# Patient Record
Sex: FEMALE | Race: BLACK OR AFRICAN AMERICAN | NOT HISPANIC OR LATINO | URBAN - METROPOLITAN AREA
[De-identification: names, ages, dates, MRNs, and addresses within clinical notes are randomized per-mention and may not be internally consistent; named-entity substitution may affect disease eponyms.]

---

## 2019-01-01 ENCOUNTER — EMERGENCY (EMERGENCY)
Facility: HOSPITAL | Age: 24
LOS: 1 days | Discharge: OTHER INSTITUTION | End: 2019-01-01
Attending: EMERGENCY MEDICINE
Payer: COMMERCIAL

## 2019-01-01 VITALS
OXYGEN SATURATION: 98 % | HEART RATE: 74 BPM | RESPIRATION RATE: 17 BRPM | DIASTOLIC BLOOD PRESSURE: 78 MMHG | SYSTOLIC BLOOD PRESSURE: 109 MMHG | TEMPERATURE: 98 F | WEIGHT: 115.96 LBS | HEIGHT: 62 IN

## 2019-01-01 DIAGNOSIS — F33.2 MAJOR DEPRESSIVE DISORDER, RECURRENT SEVERE WITHOUT PSYCHOTIC FEATURES: ICD-10-CM

## 2019-01-01 LAB
ALBUMIN SERPL ELPH-MCNC: 4.4 G/DL — SIGNIFICANT CHANGE UP (ref 3.3–5.2)
ALP SERPL-CCNC: 48 U/L — SIGNIFICANT CHANGE UP (ref 40–120)
ALT FLD-CCNC: 12 U/L — SIGNIFICANT CHANGE UP
AMPHET UR-MCNC: NEGATIVE — SIGNIFICANT CHANGE UP
ANION GAP SERPL CALC-SCNC: 12 MMOL/L — SIGNIFICANT CHANGE UP (ref 5–17)
APAP SERPL-MCNC: <7.5 UG/ML — LOW (ref 10–26)
APPEARANCE UR: CLEAR — SIGNIFICANT CHANGE UP
AST SERPL-CCNC: 13 U/L — SIGNIFICANT CHANGE UP
BARBITURATES UR SCN-MCNC: NEGATIVE — SIGNIFICANT CHANGE UP
BENZODIAZ UR-MCNC: NEGATIVE — SIGNIFICANT CHANGE UP
BILIRUB SERPL-MCNC: 1.6 MG/DL — SIGNIFICANT CHANGE UP (ref 0.4–2)
BILIRUB UR-MCNC: NEGATIVE — SIGNIFICANT CHANGE UP
BUN SERPL-MCNC: 6 MG/DL — LOW (ref 8–20)
CALCIUM SERPL-MCNC: 8.6 MG/DL — SIGNIFICANT CHANGE UP (ref 8.6–10.2)
CHLORIDE SERPL-SCNC: 105 MMOL/L — SIGNIFICANT CHANGE UP (ref 98–107)
CO2 SERPL-SCNC: 23 MMOL/L — SIGNIFICANT CHANGE UP (ref 22–29)
COCAINE METAB.OTHER UR-MCNC: NEGATIVE — SIGNIFICANT CHANGE UP
COLOR SPEC: YELLOW — SIGNIFICANT CHANGE UP
CREAT SERPL-MCNC: 0.42 MG/DL — LOW (ref 0.5–1.3)
D DIMER BLD IA.RAPID-MCNC: 164 NG/ML DDU — SIGNIFICANT CHANGE UP
DIFF PNL FLD: NEGATIVE — SIGNIFICANT CHANGE UP
EPI CELLS # UR: SIGNIFICANT CHANGE UP
ETHANOL SERPL-MCNC: <10 MG/DL — SIGNIFICANT CHANGE UP
GLUCOSE SERPL-MCNC: 77 MG/DL — SIGNIFICANT CHANGE UP (ref 70–115)
GLUCOSE UR QL: NEGATIVE MG/DL — SIGNIFICANT CHANGE UP
HCG SERPL-ACNC: <4 MIU/ML — SIGNIFICANT CHANGE UP
HCG UR QL: NEGATIVE — SIGNIFICANT CHANGE UP
HCT VFR BLD CALC: 29.1 % — LOW (ref 37–47)
HGB BLD-MCNC: 10.4 G/DL — LOW (ref 12–16)
KETONES UR-MCNC: ABNORMAL
LEUKOCYTE ESTERASE UR-ACNC: ABNORMAL
MCHC RBC-ENTMCNC: 29.9 PG — SIGNIFICANT CHANGE UP (ref 27–31)
MCHC RBC-ENTMCNC: 35.7 G/DL — SIGNIFICANT CHANGE UP (ref 32–36)
MCV RBC AUTO: 83.6 FL — SIGNIFICANT CHANGE UP (ref 81–99)
METHADONE UR-MCNC: NEGATIVE — SIGNIFICANT CHANGE UP
NITRITE UR-MCNC: NEGATIVE — SIGNIFICANT CHANGE UP
OPIATES UR-MCNC: NEGATIVE — SIGNIFICANT CHANGE UP
PCP SPEC-MCNC: SIGNIFICANT CHANGE UP
PCP UR-MCNC: NEGATIVE — SIGNIFICANT CHANGE UP
PH UR: 6 — SIGNIFICANT CHANGE UP (ref 5–8)
PLATELET # BLD AUTO: 441 K/UL — HIGH (ref 150–400)
POTASSIUM SERPL-MCNC: 3.6 MMOL/L — SIGNIFICANT CHANGE UP (ref 3.5–5.3)
POTASSIUM SERPL-SCNC: 3.6 MMOL/L — SIGNIFICANT CHANGE UP (ref 3.5–5.3)
PROT SERPL-MCNC: 6.6 G/DL — SIGNIFICANT CHANGE UP (ref 6.6–8.7)
PROT UR-MCNC: 15 MG/DL
RBC # BLD: 3.48 M/UL — LOW (ref 4.4–5.2)
RBC # FLD: 17.3 % — HIGH (ref 11–15.6)
RBC CASTS # UR COMP ASSIST: NEGATIVE /HPF — SIGNIFICANT CHANGE UP (ref 0–4)
SALICYLATES SERPL-MCNC: 3 MG/DL — LOW (ref 10–20)
SODIUM SERPL-SCNC: 140 MMOL/L — SIGNIFICANT CHANGE UP (ref 135–145)
SP GR SPEC: 1.02 — SIGNIFICANT CHANGE UP (ref 1.01–1.02)
THC UR QL: NEGATIVE — SIGNIFICANT CHANGE UP
TROPONIN T SERPL-MCNC: <0.01 NG/ML — SIGNIFICANT CHANGE UP (ref 0–0.06)
TSH SERPL-MCNC: 0.97 UIU/ML — SIGNIFICANT CHANGE UP (ref 0.27–4.2)
UROBILINOGEN FLD QL: 4 MG/DL
WBC # BLD: 11.4 K/UL — HIGH (ref 4.8–10.8)
WBC # FLD AUTO: 11.4 K/UL — HIGH (ref 4.8–10.8)
WBC UR QL: SIGNIFICANT CHANGE UP

## 2019-01-01 PROCEDURE — 85379 FIBRIN DEGRADATION QUANT: CPT

## 2019-01-01 PROCEDURE — 99285 EMERGENCY DEPT VISIT HI MDM: CPT

## 2019-01-01 PROCEDURE — 84484 ASSAY OF TROPONIN QUANT: CPT

## 2019-01-01 PROCEDURE — 36415 COLL VENOUS BLD VENIPUNCTURE: CPT

## 2019-01-01 PROCEDURE — 93005 ELECTROCARDIOGRAM TRACING: CPT

## 2019-01-01 PROCEDURE — 81025 URINE PREGNANCY TEST: CPT

## 2019-01-01 PROCEDURE — 85027 COMPLETE CBC AUTOMATED: CPT

## 2019-01-01 PROCEDURE — 93010 ELECTROCARDIOGRAM REPORT: CPT

## 2019-01-01 PROCEDURE — 81001 URINALYSIS AUTO W/SCOPE: CPT

## 2019-01-01 PROCEDURE — 84702 CHORIONIC GONADOTROPIN TEST: CPT

## 2019-01-01 PROCEDURE — 84443 ASSAY THYROID STIM HORMONE: CPT

## 2019-01-01 PROCEDURE — 71046 X-RAY EXAM CHEST 2 VIEWS: CPT | Mod: 26

## 2019-01-01 PROCEDURE — 80307 DRUG TEST PRSMV CHEM ANLYZR: CPT

## 2019-01-01 PROCEDURE — 71046 X-RAY EXAM CHEST 2 VIEWS: CPT

## 2019-01-01 PROCEDURE — 80053 COMPREHEN METABOLIC PANEL: CPT

## 2019-01-01 RX ORDER — SODIUM CHLORIDE 9 MG/ML
1000 INJECTION INTRAMUSCULAR; INTRAVENOUS; SUBCUTANEOUS ONCE
Qty: 0 | Refills: 0 | Status: COMPLETED | OUTPATIENT
Start: 2019-01-01 | End: 2019-01-01

## 2019-01-01 RX ADMIN — SODIUM CHLORIDE 2000 MILLILITER(S): 9 INJECTION INTRAMUSCULAR; INTRAVENOUS; SUBCUTANEOUS at 13:45

## 2019-01-01 NOTE — ED PROVIDER NOTE - MEDICAL DECISION MAKING DETAILS
patient without any PE risk factors- subjectively thinks left leg swollen but not evident on exam, and not currently on OCP, will get d-dimer. labs with TSH. patient endorsing anxiety/SI. will need 1:1 and psych consult. likely etiology of CP as patient is 23 and healthy. will check labs/cxr reasses

## 2019-01-01 NOTE — ED BEHAVIORAL HEALTH NOTE - BEHAVIORAL HEALTH NOTE
SW Note: pt requires inpatient admission for safety and stability. pt agreeable to voluntary legals - but prefers to wait to see if she can go to [facility] [place] or [place]  as [facility] is too far and [facility] will not accept 9.13.  [facility] states there are no adult beds available at this time.  SW to follow for transfer when appropriate bed is available.

## 2019-01-01 NOTE — ED PROVIDER NOTE - PHYSICAL EXAMINATION
Gen: NAD, AOx3  Head: NCAT  HEENT: PERRL, EOMI, oral mucosa moist, normal conjunctiva, neck supple  Lung: CTAB, no respiratory distress  CV: rrr, no murmur, Normal perfusion  Abd: soft, NTND, no CVA tenderness  MSK: No edema, no visible deformities, no asymmetry or ttp b/l calves  Neuro: No focal neurologic deficits  Skin: No rash   Psych: normal affect, tearful, crying during exam

## 2019-01-01 NOTE — ED PROVIDER NOTE - OBJECTIVE STATEMENT
22yo F with palpitations started last night when going to sleep, fell asleep when woke up still had palpitations. also with CP sharp- midsternal nonradiating. intermittent +pleuritic. no fever/chills +dry cough. no nausea/vomiting. feels left leg is swollen, no recent travel/trauma/sx. no h/o DVT/PE was on OCP not in ~1 month. LMP 12/2. has a lot of stress at work/life. sees therapist, feels anxious/sad and this morning suicidal thoughts. no plan. no attempts hospitalization in past. no drinking/drugs. +mild alcohol

## 2019-01-01 NOTE — ED ADULT NURSE NOTE - OBJECTIVE STATEMENT
pt came to the ED for c/o of feeling that her heart is raising and having difficulty of catching her breath.   pt stated that has a PMH of anxiety and depression in the past.  pt is A/Ox3 and very anxious.

## 2019-01-01 NOTE — ED PROVIDER NOTE - NS ED ROS FT
ROS: +CP/SOB. +cough. no fever. no n/v/d/c. no abd pain. no rash. no bleeding. no urinary complaints. no weakness. no vision changes. no HA. no neck/back pain. no extremity swelling/deformity. No change in mental status.

## 2019-01-01 NOTE — ED BEHAVIORAL HEALTH ASSESSMENT NOTE - SUICIDE RISK FACTORS
Hopelessness/Agitation/severe anxiety/Perceived burden on family and others/Substance abuse/dependence

## 2019-01-01 NOTE — ED ADULT NURSE REASSESSMENT NOTE - DESCRIPTION
received pt ao states she felt like she was having a panic attack.  I couldn't breath my heart felt like it was going to stop. I think of hurting myself.  I would just go out driving in my car and never come back.  I did try this once in oct.  I have family problems.  I feel like my mother blames me for everything I don't have a close relationship with her.  she doesn't say I do a good job.  she doesn't hug me.  I would like to be close to her.  I now live with my boyfriend which is good.  pt states sometimes has a hard time sleeping and will have a few beers to help her sleep.  but then I cant get up for work.  I call in.  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   pt admits to recently starting to see a therapist in dec.  she goes once a week on mon. pt  states after seeing the therapist on mon she makes me feel better. I feel good for a few days.  pt thinks in pt admission might be good for her.  pt states I will do anything I like my life I want to make it better.  pt tearful crying during interview.  support given safety maintained plan of care dicussed with pt .  aware to be admitted and await available in pt unit. pt verbalizes agreement in plan of care. received pt ao in street clothing.states she felt like she was having a panic attack.  I couldn't breath my heart felt like it was going to stop. I think of hurting myself.  I would just go out driving in my car and never come back.  I did try this once in oct.  I have family problems.  I feel like my mother blames me for everything I don't have a close relationship with her.  she doesn't say I do a good job.  she doesn't hug me.  I would like to be close to her.  I now live with my boyfriend which is good.  pt states sometimes has a hard time sleeping and will have a few beers to help her sleep.  but then I cant get up for work.  I call in.  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   pt admits to recently starting to see a therapist in dec.  she goes once a week on mon. pt  states after seeing the therapist on mon she makes me feel better. I feel good for a few days.  pt thinks in pt admission might be good for her.  pt states I will do anything I like my life I want to make it better.  pt tearful crying during interview.  support given safety maintained plan of care dicussed with pt .  aware to be admitted and await available in pt unit. pt verbalizes agreement in plan of care. pt wanded for contraband belonging secured in  closet.

## 2019-01-01 NOTE — ED BEHAVIORAL HEALTH ASSESSMENT NOTE - HPI (INCLUDE ILLNESS QUALITY, SEVERITY, DURATION, TIMING, CONTEXT, MODIFYING FACTORS, ASSOCIATED SIGNS AND SYMPTOMS)
Pt is a 24 yo female who presented to ED for SOB & difficulty breathing.  Reports she gets so panicky and anxious she is not able to breathe.  Pt reports she has thoughts of suicide- getting in the car and never coming back.  Pt reports multiple family stressors.  States her mother always blames her for everything and makes her feel guilty by telling her that she gave her an education. She is tearful.  Reports she spent last night with her family and it went well but then this morning her mother called and told her everything was awful.  Pt recently started seeing an individual psychotherapist and states she feels better after talking to her but then several days later she feels depressed and anxious again.  Reports she lives with her boyfriend to get away from her mother.  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  Pt states she does not feel safe in her current state of mind.  Pt requires inpatient psychiatric hospitalization for safety and medication management.

## 2019-01-01 NOTE — ED BEHAVIORAL HEALTH ASSESSMENT NOTE - SUMMARY
Pt is a 22 yo female who presented to ED for SOB & difficulty breathing.  Reports she gets so panicky and anxious she is not able to breathe.  Pt reports she has thoughts of suicide- getting in the car and never coming back.  Pt reports multiple family stressors.  States her mother always blames her for everything and makes her feel guilty by telling her that she gave her an education. She is tearful.  Reports she spent last night with her family and it went well but then this morning her mother called and told her everything was awful. Pt reports she has been depressed with thoughts of not wanting to be here since she was a child.  Pt recently started seeing an individual psychotherapist and states she feels better after talking to her but then several days later she feels depressed and anxious again.  Reports she lives with her boyfriend to get away from her mother.  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  Pt states she does not feel safe in her current state of mind.  Pt requires inpatient psychiatric hospitalization for safety and medication management.

## 2019-01-02 VITALS
RESPIRATION RATE: 18 BRPM | OXYGEN SATURATION: 100 % | TEMPERATURE: 99 F | SYSTOLIC BLOOD PRESSURE: 100 MMHG | HEART RATE: 85 BPM | DIASTOLIC BLOOD PRESSURE: 66 MMHG

## 2019-01-02 NOTE — ED ADULT NURSE REASSESSMENT NOTE - GENERAL PATIENT STATE
comfortable appearance/cooperative/resting/sleeping
resting/sleeping/comfortable appearance/resp even unlabored
comfortable appearance/cooperative
cooperative/comfortable appearance

## 2019-01-02 NOTE — ED BEHAVIORAL HEALTH NOTE - BEHAVIORAL HEALTH NOTE
MAICOL Note: Pt has been accepted to [facility] for inpt psychiatric tx. Met with pt. She is in agreement with the plan and signed voluntary admission form, 9.13. She has been using the unit phone and notified her boyfriend. Ambulance arranged with [facility]. Insurance auth completed, spoke with Salomon from [employer] [phone]. Auth approved for 2 days 1/2/19 & 1/3/19. Auth# [id number]. Review due 1/4 with ins JUANITA Mane @ [phone]. Info forwarded to [facility] dept.

## 2019-01-02 NOTE — ED ADULT NURSE REASSESSMENT NOTE - COMFORT CARE
warm blanket provided
meal provided/ambulated to bathroom/plan of care explained
plan of care explained

## 2019-02-09 ENCOUNTER — EMERGENCY (EMERGENCY)
Facility: HOSPITAL | Age: 24
LOS: 1 days | Discharge: HOME | End: 2019-02-09
Attending: EMERGENCY MEDICINE
Payer: COMMERCIAL

## 2019-02-09 VITALS
OXYGEN SATURATION: 100 % | HEART RATE: 91 BPM | TEMPERATURE: 98 F | DIASTOLIC BLOOD PRESSURE: 55 MMHG | RESPIRATION RATE: 18 BRPM | SYSTOLIC BLOOD PRESSURE: 98 MMHG

## 2019-02-09 VITALS
RESPIRATION RATE: 18 BRPM | TEMPERATURE: 98 F | HEART RATE: 77 BPM | DIASTOLIC BLOOD PRESSURE: 68 MMHG | SYSTOLIC BLOOD PRESSURE: 99 MMHG | OXYGEN SATURATION: 98 %

## 2019-02-09 DIAGNOSIS — F44.89 OTHER DISSOCIATIVE AND CONVERSION DISORDERS: ICD-10-CM

## 2019-02-09 PROBLEM — F32.9 MAJOR DEPRESSIVE DISORDER, SINGLE EPISODE, UNSPECIFIED: Chronic | Status: ACTIVE | Noted: 2019-01-01

## 2019-02-09 PROBLEM — F41.9 ANXIETY DISORDER, UNSPECIFIED: Chronic | Status: ACTIVE | Noted: 2019-01-01

## 2019-02-09 PROBLEM — J45.909 UNSPECIFIED ASTHMA, UNCOMPLICATED: Chronic | Status: ACTIVE | Noted: 2019-01-01

## 2019-02-09 LAB
ALBUMIN SERPL ELPH-MCNC: 4.5 G/DL — SIGNIFICANT CHANGE UP (ref 3.3–5.2)
ALP SERPL-CCNC: 54 U/L — SIGNIFICANT CHANGE UP (ref 40–120)
ALT FLD-CCNC: 23 U/L — SIGNIFICANT CHANGE UP
AMPHET UR-MCNC: NEGATIVE — SIGNIFICANT CHANGE UP
ANION GAP SERPL CALC-SCNC: 12 MMOL/L — SIGNIFICANT CHANGE UP (ref 5–17)
APPEARANCE UR: CLEAR — SIGNIFICANT CHANGE UP
APTT BLD: 35.4 SEC — SIGNIFICANT CHANGE UP (ref 27.5–36.3)
AST SERPL-CCNC: 24 U/L — SIGNIFICANT CHANGE UP
BACTERIA # UR AUTO: NEGATIVE — SIGNIFICANT CHANGE UP
BARBITURATES UR SCN-MCNC: NEGATIVE — SIGNIFICANT CHANGE UP
BASOPHILS # BLD AUTO: 0 K/UL — SIGNIFICANT CHANGE UP (ref 0–0.2)
BASOPHILS NFR BLD AUTO: 0.2 % — SIGNIFICANT CHANGE UP (ref 0–2)
BENZODIAZ UR-MCNC: NEGATIVE — SIGNIFICANT CHANGE UP
BILIRUB SERPL-MCNC: 1.5 MG/DL — SIGNIFICANT CHANGE UP (ref 0.4–2)
BILIRUB UR-MCNC: NEGATIVE — SIGNIFICANT CHANGE UP
BUN SERPL-MCNC: 8 MG/DL — SIGNIFICANT CHANGE UP (ref 8–20)
CALCIUM SERPL-MCNC: 8.8 MG/DL — SIGNIFICANT CHANGE UP (ref 8.6–10.2)
CHLORIDE SERPL-SCNC: 104 MMOL/L — SIGNIFICANT CHANGE UP (ref 98–107)
CO2 SERPL-SCNC: 25 MMOL/L — SIGNIFICANT CHANGE UP (ref 22–29)
COCAINE METAB.OTHER UR-MCNC: NEGATIVE — SIGNIFICANT CHANGE UP
COLOR SPEC: YELLOW — SIGNIFICANT CHANGE UP
CREAT SERPL-MCNC: 0.44 MG/DL — LOW (ref 0.5–1.3)
DIFF PNL FLD: NEGATIVE — SIGNIFICANT CHANGE UP
EOSINOPHIL # BLD AUTO: 0.1 K/UL — SIGNIFICANT CHANGE UP (ref 0–0.5)
EOSINOPHIL NFR BLD AUTO: 1.4 % — SIGNIFICANT CHANGE UP (ref 0–6)
EPI CELLS # UR: SIGNIFICANT CHANGE UP
ETHANOL SERPL-MCNC: <10 MG/DL — SIGNIFICANT CHANGE UP
GLUCOSE SERPL-MCNC: 88 MG/DL — SIGNIFICANT CHANGE UP (ref 70–115)
GLUCOSE UR QL: NEGATIVE MG/DL — SIGNIFICANT CHANGE UP
HCG SERPL-ACNC: <4 MIU/ML — SIGNIFICANT CHANGE UP
HCT VFR BLD CALC: 34.9 % — LOW (ref 37–47)
HGB BLD-MCNC: 12.2 G/DL — SIGNIFICANT CHANGE UP (ref 12–16)
INR BLD: 1.15 RATIO — SIGNIFICANT CHANGE UP (ref 0.88–1.16)
KETONES UR-MCNC: ABNORMAL
LEUKOCYTE ESTERASE UR-ACNC: ABNORMAL
LYMPHOCYTES # BLD AUTO: 2.4 K/UL — SIGNIFICANT CHANGE UP (ref 1–4.8)
LYMPHOCYTES # BLD AUTO: 26.8 % — SIGNIFICANT CHANGE UP (ref 20–55)
MAGNESIUM SERPL-MCNC: 2 MG/DL — SIGNIFICANT CHANGE UP (ref 1.6–2.6)
MCHC RBC-ENTMCNC: 29.8 PG — SIGNIFICANT CHANGE UP (ref 27–31)
MCHC RBC-ENTMCNC: 35 G/DL — SIGNIFICANT CHANGE UP (ref 32–36)
MCV RBC AUTO: 85.1 FL — SIGNIFICANT CHANGE UP (ref 81–99)
METHADONE UR-MCNC: NEGATIVE — SIGNIFICANT CHANGE UP
MONOCYTES # BLD AUTO: 0.6 K/UL — SIGNIFICANT CHANGE UP (ref 0–0.8)
MONOCYTES NFR BLD AUTO: 6.6 % — SIGNIFICANT CHANGE UP (ref 3–10)
NEUTROPHILS # BLD AUTO: 5.7 K/UL — SIGNIFICANT CHANGE UP (ref 1.8–8)
NEUTROPHILS NFR BLD AUTO: 64.8 % — SIGNIFICANT CHANGE UP (ref 37–73)
NITRITE UR-MCNC: NEGATIVE — SIGNIFICANT CHANGE UP
OPIATES UR-MCNC: NEGATIVE — SIGNIFICANT CHANGE UP
PCP SPEC-MCNC: SIGNIFICANT CHANGE UP
PCP UR-MCNC: NEGATIVE — SIGNIFICANT CHANGE UP
PH UR: 6.5 — SIGNIFICANT CHANGE UP (ref 5–8)
PLATELET # BLD AUTO: 438 K/UL — HIGH (ref 150–400)
POTASSIUM SERPL-MCNC: 4 MMOL/L — SIGNIFICANT CHANGE UP (ref 3.5–5.3)
POTASSIUM SERPL-SCNC: 4 MMOL/L — SIGNIFICANT CHANGE UP (ref 3.5–5.3)
PROT SERPL-MCNC: 7.3 G/DL — SIGNIFICANT CHANGE UP (ref 6.6–8.7)
PROT UR-MCNC: 15 MG/DL
PROTHROM AB SERPL-ACNC: 13.3 SEC — HIGH (ref 10–12.9)
RBC # BLD: 4.1 M/UL — LOW (ref 4.4–5.2)
RBC # FLD: 16.2 % — HIGH (ref 11–15.6)
RBC CASTS # UR COMP ASSIST: NEGATIVE /HPF — SIGNIFICANT CHANGE UP (ref 0–4)
SODIUM SERPL-SCNC: 141 MMOL/L — SIGNIFICANT CHANGE UP (ref 135–145)
SP GR SPEC: 1.01 — SIGNIFICANT CHANGE UP (ref 1.01–1.02)
THC UR QL: NEGATIVE — SIGNIFICANT CHANGE UP
TROPONIN T SERPL-MCNC: <0.01 NG/ML — SIGNIFICANT CHANGE UP (ref 0–0.06)
TSH SERPL-MCNC: 3.87 UIU/ML — SIGNIFICANT CHANGE UP (ref 0.27–4.2)
UROBILINOGEN FLD QL: 4 MG/DL
WBC # BLD: 8.8 K/UL — SIGNIFICANT CHANGE UP (ref 4.8–10.8)
WBC # FLD AUTO: 8.8 K/UL — SIGNIFICANT CHANGE UP (ref 4.8–10.8)
WBC UR QL: SIGNIFICANT CHANGE UP

## 2019-02-09 PROCEDURE — 70450 CT HEAD/BRAIN W/O DYE: CPT | Mod: 26

## 2019-02-09 PROCEDURE — 99284 EMERGENCY DEPT VISIT MOD MDM: CPT | Mod: 25

## 2019-02-09 PROCEDURE — 85730 THROMBOPLASTIN TIME PARTIAL: CPT

## 2019-02-09 PROCEDURE — 87086 URINE CULTURE/COLONY COUNT: CPT

## 2019-02-09 PROCEDURE — 70450 CT HEAD/BRAIN W/O DYE: CPT

## 2019-02-09 PROCEDURE — 85610 PROTHROMBIN TIME: CPT

## 2019-02-09 PROCEDURE — 84702 CHORIONIC GONADOTROPIN TEST: CPT

## 2019-02-09 PROCEDURE — 96374 THER/PROPH/DIAG INJ IV PUSH: CPT

## 2019-02-09 PROCEDURE — 85027 COMPLETE CBC AUTOMATED: CPT

## 2019-02-09 PROCEDURE — 36415 COLL VENOUS BLD VENIPUNCTURE: CPT

## 2019-02-09 PROCEDURE — 81001 URINALYSIS AUTO W/SCOPE: CPT

## 2019-02-09 PROCEDURE — 99285 EMERGENCY DEPT VISIT HI MDM: CPT | Mod: 25

## 2019-02-09 PROCEDURE — 84484 ASSAY OF TROPONIN QUANT: CPT

## 2019-02-09 PROCEDURE — 83735 ASSAY OF MAGNESIUM: CPT

## 2019-02-09 PROCEDURE — 80307 DRUG TEST PRSMV CHEM ANLYZR: CPT

## 2019-02-09 PROCEDURE — 82962 GLUCOSE BLOOD TEST: CPT

## 2019-02-09 PROCEDURE — 84443 ASSAY THYROID STIM HORMONE: CPT

## 2019-02-09 PROCEDURE — 96361 HYDRATE IV INFUSION ADD-ON: CPT

## 2019-02-09 PROCEDURE — 90792 PSYCH DIAG EVAL W/MED SRVCS: CPT | Mod: GT

## 2019-02-09 PROCEDURE — 80053 COMPREHEN METABOLIC PANEL: CPT

## 2019-02-09 RX ORDER — SODIUM CHLORIDE 9 MG/ML
999 INJECTION INTRAMUSCULAR; INTRAVENOUS; SUBCUTANEOUS ONCE
Qty: 0 | Refills: 0 | Status: COMPLETED | OUTPATIENT
Start: 2019-02-09 | End: 2019-02-09

## 2019-02-09 RX ORDER — KETOROLAC TROMETHAMINE 30 MG/ML
30 SYRINGE (ML) INJECTION ONCE
Qty: 0 | Refills: 0 | Status: DISCONTINUED | OUTPATIENT
Start: 2019-02-09 | End: 2019-02-09

## 2019-02-09 RX ADMIN — Medication 30 MILLIGRAM(S): at 03:58

## 2019-02-09 RX ADMIN — Medication 30 MILLIGRAM(S): at 04:15

## 2019-02-09 RX ADMIN — SODIUM CHLORIDE 999 MILLILITER(S): 9 INJECTION INTRAMUSCULAR; INTRAVENOUS; SUBCUTANEOUS at 06:37

## 2019-02-09 RX ADMIN — Medication 0.5 MILLIGRAM(S): at 06:38

## 2019-02-09 RX ADMIN — SODIUM CHLORIDE 999 MILLILITER(S): 9 INJECTION INTRAMUSCULAR; INTRAVENOUS; SUBCUTANEOUS at 01:40

## 2019-02-09 NOTE — ED PROVIDER NOTE - PHYSICAL EXAMINATION
Constitutional : Appears dazed and teary, with decreased eye contact   Head :NC AT , no swelling  Eyes :eomi, no swelling  Mouth :mm moist,  Neck : supple, trachea in midline  Chest :[place] air entry, symm chest expansion, no distress  Heart :S1 S2 distant  Abdomen :abd soft, non tender  Musc/Skel :ext no swelling, no deformity, no spine tenderness, distal pulses present  Neuro: AAO x1  no focal deficits   Psych:  Level of Consciousness: alert, follows commands, CONFUSED, UNRESPONSIVE  Mood: appropriate, MANIC, DEPRESSED, ANXIOUS, AGITATED  Affect: BLUNTED, EXAGGERATED, FLAT, HEIGHTENED, OVERLY DRAMATIC  Speech: clear, APHASIC, CLUTTERING, GARBLED, ECHOLALIA, HESITANT, INCOHERENT, LOUD, MUTISM, NEOLOGISMS, PRESSURED, QUIET, RAPID, SLOW, STUTTERING  Risk of Harm: no verbalization of thoughts of harm, EVIDENCE OF CUTTING, VERBALIZING WISH TO HARM SELF, VERBALIZING WISH TO HARM OTHERS, VERBALIZING SUICIDAL IDEATIONS, VERBALIZING HOMICIDAL IDEATIONS  Psychiatric Memory: short term intact, SHORT TERM DEFICIT, LONG TERM DEFICIT  Behavior: normal, ABNORMAL GAIT, AKATHISIA, AKINESIA, PSYCHOMOTOR AGITATION, NO EYE CONTACT  Abnormal Movements: COREIFORM, ATHETOID, TREMOR, DYSTONIA, TICS, ECHOPRAXIA, WAXY FLEXIBILITY, PARATONIA  Perception: AUDITORY HALLUCINATIONS, DEREALIZATION, DEPERSONALIZATION, TIME DISTORTION, VISUAL HALLUCINATIONS Constitutional : Appears dazed and teary, with decreased eye contact   Head :NC AT , no swelling  Eyes :eomi, no swelling  Mouth :mm moist,  Neck : supple, trachea in midline  Chest :[place] air entry, symm chest expansion, no distress  Heart :S1 S2 distant  Abdomen :abd soft, non tender  Musc/Skel :ext no swelling, no deformity, no spine tenderness, distal pulses present  Neuro: AAO x1  no focal deficits   Level of Consciousness: alert, follows commands  Mood: ANXIOUS, AGITATED  Affect: EXAGGERATED  Speech: clear, HESITANT, INCOHERENT, PRESSURED  Risk of Harm: lack of realization and self awareness   Psychiatric Memory: difficult to assess, patient is having outer body experience   Behavior: PSYCHOMOTOR AGITATION, NO EYE CONTACT  Abnormal Movements: DYSTONIA with abnormal eye face and hand movements   Perception: AUDITORY HALLUCINATIONS, DEREALIZATION, DEPERSONALIZATION, TIME DISTORTION

## 2019-02-09 NOTE — ED BEHAVIORAL HEALTH ASSESSMENT NOTE - OTHER PAST PSYCHIATRIC HISTORY (INCLUDE DETAILS REGARDING ONSET, COURSE OF ILLNESS, INPATIENT/OUTPATIENT TREATMENT)
seeing therapist since December 2018; she has not been seeing her since she left the hospital; was given a new therapist, but she wants to return to the previous therapist; has a psychiatrist through [facility]

## 2019-02-09 NOTE — ED PROVIDER NOTE - MEDICAL DECISION MAKING DETAILS
24 y/o F 22 y/o F with hx of depression with acute onset of auditory hallucinations and psychosis, plan to do labs, head CT and re-evaluate.

## 2019-02-09 NOTE — ED ADULT NURSE NOTE - HPI (INCLUDE ILLNESS QUALITY, SEVERITY, DURATION, TIMING, CONTEXT, MODIFYING FACTORS, ASSOCIATED SIGNS AND SYMPTOMS)
Has had a previous episode like this one in January has been started on medications. Family states she has been taking her medications but don't seem to be helping.

## 2019-02-09 NOTE — ED PROVIDER NOTE - CLINICAL SUMMARY MEDICAL DECISION MAKING FREE TEXT BOX
24 y/o F with hx of depression with acute onset of auditory hallucinations and psychosis, plan to do labs, head CT and re-evaluate.

## 2019-02-09 NOTE — ED ADULT TRIAGE NOTE - CHIEF COMPLAINT QUOTE
patient rapid response in hospital lobby, "passed out" responsive to tactile stimuli, patient moved to ED for evaluation, patient able to move all extremities, awake and following commands

## 2019-02-09 NOTE — ED BEHAVIORAL HEALTH ASSESSMENT NOTE - DETAILS
[facility] - 1/2/19 - 1/8/19 spoke with boyfriend patient being discharged see hpi see HPI

## 2019-02-09 NOTE — ED BEHAVIORAL HEALTH ASSESSMENT NOTE - RISK ASSESSMENT
At this time, patient carries a moderate chronic risk of harm to self. Her risk factors include her hx of depression, hospitalization, hx of suicidal ideation and hx of trauma. Potential precipitating factors include the coming anniversary of her sister's death. However, her protective factors do outweigh her risk factors and includes her supportive family, supportive boyfriend, engagement in care, lack of prior suicide attempt, lack of substance use, engagement in treatment planning, female sex, and current denial of any thoughts of suicide, violence, aggression and/or homicide. Safe for d/c to outpatient level of care at this time.

## 2019-02-09 NOTE — ED ADULT NURSE NOTE - OBJECTIVE STATEMENT
Pt A&Ox4 at this time, was a rapid response in main lobby, patient was responsive to tactile stimuli only. Family at bedside states patient is not acting like herself, patient has no recollection of how she got her or why she is here. Patient claims to hear voices telling her to hurt herself. Pt resting comfortably, VSS, no signs of distress at this time, CM in place, 1:1 in place for safety, safety maintained, call bell in reach.

## 2019-02-09 NOTE — ED BEHAVIORAL HEALTH ASSESSMENT NOTE - DIFFERENTIAL
Other Specified Dissociative Disorder  major depressive disorder, single episode, severe, without psychotic features

## 2019-02-09 NOTE — ED BEHAVIORAL HEALTH ASSESSMENT NOTE - HPI (INCLUDE ILLNESS QUALITY, SEVERITY, DURATION, TIMING, CONTEXT, MODIFYING FACTORS, ASSOCIATED SIGNS AND SYMPTOMS)
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 She has been feeling distracted lately. After the visit, she went out with her cousin and friends. She didn't feel well, with her heart racing, and decided to go back home, saying she felt an urge to smoke a cigarette "which I never did before." She went home, parked he car, and upon going inside, she was "hearing voices and I lost control...I could hear it clearly." Says the voices included her sister who has  in the past, telling her to meet her outside. She says that she could see her sister in the house, and she said to Neeta that she needed to go outside.  Says that in addition to seeing her sister, she saw her sister with 5 other people in the home who were talking to her sister. Says that she is unsure of what happened next, saying that her cousin was telling her that she didn't know who her cousin was.    She discusses her hospitalization briefly, saying that it had helped. Says that she has trired to develop coping skills, saying that prior to the hospital, she didn't want to go out, but now is trying to be around people, which helps. When she is alone, she finds it difficult, thinking about her recent stressors as well as things that she has dealt with since she was younger, saying she has had depression since age 12.  Says that while she is feeling overall, "it just gets worse sometimes."  She discusses how close she was to her sister, her twin, who  when she was 14 y/o. Says that now that she is not around, she feels like "she is empty inside...like I can't function without her." She says that she feels "broken from losing her sister." She also notes the situation involving her biological father, whom she did not know prior to this past summer, being told that another man was her real father, when he is not.  Her father subsequently was killed 1-2 months after she met him.   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 She has been feeling distracted lately. After the visit, she went out with her cousin and friends. She didn't feel well, with her heart racing, and decided to go back home, saying she felt an urge to smoke a cigarette "which I never did before." She went home, parked he car, and upon going inside, she was "hearing voices and I lost control...I could hear it clearly." Says the voices included her sister who has  in the past, telling her to meet her outside. She says that she could see her sister in the house, and she said to Neeta that she needed to go outside.  Says that in addition to seeing her sister, she saw her sister with 5 other people in the home who were talking to her sister. Says that she is unsure of what happened next, saying that her cousin was telling her that she didn't know who her cousin was.    She discusses her hospitalization briefly, saying that it had helped. Says that she has trired to develop coping skills, saying that prior to the hospital, she didn't want to go out, but now is trying to be around people, which helps. When she is alone, she finds it difficult, thinking about her recent stressors as well as things that she has dealt with since she was younger, saying she has had depression since age 12.  Says that while she is feeling overall, "it just gets worse sometimes."  She discusses how close she was to her sister, her twin, who  when she was 14 y/o. Says that now that she is not around, she feels like "she is empty inside...like I can't function without her." She says that she feels "broken from losing her sister." She also notes the situation involving her biological father, whom she did not know prior to this past summer, being told that another man was her real father, when he is not.  Her father subsequently was killed 1-2 months after she met him.   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     see addendum for collateral    As per patient, she says that she met with her psychiatrist this morning. After the visit, she went out with her cousin and friends. She didn't feel well, with her heart racing, and decided to go back home, saying she felt an urge to smoke a cigarette "which I never did before." She went home, parked he car, and upon going inside, she was "hearing voices and I lost control...I could hear it clearly." Says the voices included her sister who has  in the past, telling her to meet her outside. She says that she could see her sister in the house, and she said to Neeta that she needed to go outside.  Says that in addition to seeing her sister, she saw her sister with 5 other people in the home who were talking to her sister. Says that she is unsure of what happened next, saying that her cousin was telling her that she didn't know who her cousin was.  She has since improved in her mentation, being oriented and familiar with them. She discusses her hospitalization briefly, saying that it had helped. Says that she has tried to develop coping skills, saying that prior to the hospital, she didn't want to go out, but now is trying to be around people, which helps. When she is alone, she finds it difficult, thinking about her recent stressors as well as things that she has dealt with since she was younger, saying she has had depression since age 12.  Says that while she is feeling overall, "it just gets worse sometimes."  She discusses how close she was to her sister, her twin, who  when she was 14 y/o. Says that now that she is not around, she feels like "she is empty inside...like I can't function without her." She says that she feels "broken from losing her sister." She also notes the situation involving her biological father, whom she did not know prior to this past summer, being told that another man was her real father, when he is not.  Her father subsequently was killed 1-2 months after she met him. In addition, tomorrow is the 10 year anniversary of the death of her sister. Patient denies any psychotic symptoms including paranoia, ideas of reference, thought insertion/broadcasting, or auditory/visual/olfactory/tactile/gustatory hallucinations. 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     see addendum for collateral    As per patient, she says that she met with her psychiatrist this morning. After the visit, she went out with her cousin and friends. She didn't feel well, with her heart racing, and decided to go back home, saying she felt an urge to smoke a cigarette "which I never did before." She went home, parked he car, and upon going inside, she was "hearing voices and I lost control...I could hear it clearly." Says the voices included her sister who has  in the past, telling her to meet her outside. She says that she could see her sister in the house, and she said to Neeta that she needed to go outside.  Says that in addition to seeing her sister, she saw her sister with 5 other people in the home who were talking to her sister. Says that she is unsure of what happened next, saying that her cousin was telling her that she didn't know who her cousin was.  She has since improved in her mentation, being oriented and familiar with them. She discusses her hospitalization briefly, saying that it had helped. Says that she has tried to develop coping skills, saying that prior to the hospital, she didn't want to go out, but now is trying to be around people, which helps. When she is alone, she finds it difficult, thinking about her recent stressors as well as things that she has dealt with since she was younger, saying she has had depression since age 12.  Says that while she is feeling overall, "it just gets worse sometimes."  She discusses how close she was to her sister, her twin, who  when she was 14 y/o. Says that now that she is not around, she feels like "she is empty inside...like I can't function without her." She says that she feels "broken from losing her sister." She also notes the situation involving her biological father, whom she did not know prior to this past summer, being told that another man was her real father, when he is not.  Her father subsequently was killed 1-2 months after she met him. In addition, tomorrow is the 10 year anniversary of the death of her sister. Patient denies any psychotic symptoms including paranoia, ideas of reference, thought insertion/broadcasting, or auditory/visual/olfactory/tactile/gustatory hallucinations. Patient denies manic symptoms including elevated mood, increased irritability, mood lability, distractibility, grandiosity, pressured speech, increase in goal-directed activity, or decreased need for sleep. Denies any thoughts of suicide, aggression, violence of homicide.     Met with patient and boyfriend together. Discussed how he had reported that patient had had thoughts of overdosing 2 days ago. She denied this initially, but subsequently admitted to it. She says that she never really was thinking of doing it, but had an intrusive ideation. They discuss the plan for her to go to a close aunt's home so that she will be under constant watch with family this weekend. Pt is in agreement. Denied suicidal ideation/HI/I/P.

## 2019-02-09 NOTE — ED BEHAVIORAL HEALTH ASSESSMENT NOTE - NS ED BHA PLAN TR BH CONTACTED FT
left message for psychiatrist, Dr. Pedroza ([facility]) - [phone]

## 2019-02-09 NOTE — ED BEHAVIORAL HEALTH ASSESSMENT NOTE - SUMMARY
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 At this time, it appears that experienced a sudden onset of dissociation, including depersonalization, derealization and an altered perception in motor functioning, leading to fainting. The episode had a clearly delineated specific time frame and she is now back to baseline. She has had mounting panic attacks prior to this episode which likely put her at risk for such an episode. SHe is in treatment and attending and is motivated to engage in her outpatient care.

## 2019-02-09 NOTE — ED BEHAVIORAL HEALTH ASSESSMENT NOTE - DESCRIPTION
None reported Family discord; moved from [facility] when she was 14 y/o Patient was brought to main entrance of hospital at 0:30, patient collapsed and was a rapid response. Patient was found by nursing staff unconscious and unresponsive to verbal stimuli, patient responded to tactile  stimuli. Patient woke up within minutes with impaired memory. Upon waking patient did not know her name, why she was there, or hitting her head. Patient cleared in 5 minutes, speech was initially pressured. Primary nurse reports that patient is alert and oriented at this time, but still does not remember why or how she was brought to the hospital. Patient provided blood specimen willingly and was changed into gown. Patient was give 1 liter of normal saline solution at 1:02 for hydration, at 1:17 patient had head CT. At 3:58 patient was given 30mg of Toradol IV push for headache. Primary nurse reports that patient stated “everyone says her older sister is dead”, reported “hearing the voice of her dead sister telling her to kill herself”. Primary nurse notes speech is now clear, patient is linear. Patient is observed resting on stretcher. Patient has been in good behavioral control, not requiring security or medication intervention. Patient maintains appropriate eye contact. Patient has been accompanied by boyfriend and cousin in ED.

## 2019-02-09 NOTE — ED PROVIDER NOTE - OBJECTIVE STATEMENT
22 y/o F with PMHx of anxiety and depression presents to ED s/p syncopal episode in the ED donald tonight. Patient is stating "I hear my sister talking to me", (as per family sister is " and then states "I am my sister" and does not recognize her family that brought her here.     As per boyfriend and cousin, the patient has been well all day. Tonight, she left the house with her cousin to go to a party when she suddenly began to cry. 22 y/o F with PMHx of anxiety and depression presents to ED s/p syncopal episode in the ED lobby tonight. Currently in the ED, the patient is refusing to answer basic questions such as her name or why she is in the ED and is stating "I hear my sister talking to me", (as per family sister is " and then states "I am my sister" and does not recognize her family that brought her here.     As per boyfriend and cousin, the patient has been well all day. Tonight, she left the house with her cousin to go to a party when she suddenly began to cry around 2345. Family states the patient has had crying episodes in the past, but today it was more exaggerated than it normally is. Additionally, she was stating she was hearing voices, specifically her  sister, which she has never expressed before. As per boyfriend, the only medication that the patient currently takes is Lexapro, and was recent diagnosed with anxiety and depression secondary to a traumatic childhood. Today, she followed up with her psychiatrist ([facility]- goes every 2-4 weeks), with the next appointment 6 weeks from now. They state the patient does not have known drug or food allergies.

## 2019-02-10 LAB
CULTURE RESULTS: SIGNIFICANT CHANGE UP
SPECIMEN SOURCE: SIGNIFICANT CHANGE UP

## 2022-01-20 NOTE — ED BEHAVIORAL HEALTH ASSESSMENT NOTE - REMOTE MEMORY
Normal Mirvaso Pregnancy And Lactation Text: This medication has not been assigned a Pregnancy Risk Category. It is unknown if the medication is excreted in breast milk.

## 2022-02-15 NOTE — ED BEHAVIORAL HEALTH ASSESSMENT NOTE - OTHER
Quality 226: Preventive Care And Screening: Tobacco Use: Screening And Cessation Intervention: Tobacco Screening not Performed for Medical Reasons Detail Level: Detailed Quality 110: Preventive Care And Screening: Influenza Immunization: Influenza immunization was not ordered or administered, reason not given Quality 431: Preventive Care And Screening: Unhealthy Alcohol Use - Screening: Patient not identified as an unhealthy alcohol user when screened for unhealthy alcohol use using a systematic screening method Quality 130: Documentation Of Current Medications In The Medical Record: Current Medications Documented 15 LILLIAN  [facility] [place] [facility] boyfriend (Fazal) na defer to Emergency Department assessment

## 2022-05-09 ENCOUNTER — OUTPATIENT (OUTPATIENT)
Dept: OUTPATIENT SERVICES | Facility: HOSPITAL | Age: 27
LOS: 1 days | End: 2022-05-09
Payer: MEDICAID

## 2022-05-09 ENCOUNTER — EMERGENCY (EMERGENCY)
Facility: HOSPITAL | Age: 27
LOS: 1 days | Discharge: LEFT BEFORE DISCHARGE | End: 2022-05-09
Attending: EMERGENCY MEDICINE | Admitting: EMERGENCY MEDICINE
Payer: MEDICAID

## 2022-05-09 VITALS
HEART RATE: 67 BPM | SYSTOLIC BLOOD PRESSURE: 115 MMHG | OXYGEN SATURATION: 94 % | TEMPERATURE: 98 F | RESPIRATION RATE: 17 BRPM | DIASTOLIC BLOOD PRESSURE: 70 MMHG

## 2022-05-09 VITALS
HEIGHT: 62 IN | DIASTOLIC BLOOD PRESSURE: 74 MMHG | SYSTOLIC BLOOD PRESSURE: 104 MMHG | WEIGHT: 125 LBS | HEART RATE: 71 BPM | RESPIRATION RATE: 20 BRPM | TEMPERATURE: 97 F | OXYGEN SATURATION: 100 %

## 2022-05-09 VITALS — OXYGEN SATURATION: 98 % | HEART RATE: 82 BPM

## 2022-05-09 DIAGNOSIS — Z3A.00 WEEKS OF GESTATION OF PREGNANCY NOT SPECIFIED: ICD-10-CM

## 2022-05-09 DIAGNOSIS — O26.899 OTHER SPECIFIED PREGNANCY RELATED CONDITIONS, UNSPECIFIED TRIMESTER: ICD-10-CM

## 2022-05-09 PROCEDURE — 59025 FETAL NON-STRESS TEST: CPT | Mod: 26

## 2022-05-09 PROCEDURE — G0463: CPT

## 2022-05-09 PROCEDURE — 59025 FETAL NON-STRESS TEST: CPT

## 2022-05-09 PROCEDURE — L9991: CPT

## 2022-05-09 NOTE — OB PROVIDER TRIAGE NOTE - NSHPPHYSICALEXAM_GEN_ALL_CORE
VS  T(C): 37.2 (05-09-22 @ 03:35)  HR: 76 (05-09-22 @ 04:07)  BP: 111/76 (05-09-22 @ 03:36)  RR: 17 (05-09-22 @ 03:35)  SpO2: 95% (05-09-22 @ 04:07)    Gen: A&Ox3 NAD comfortable appearing  CV: RRR  Pulm: Respirations even unlabored  Abd: Soft, gravid, nontender  Ext: Full ROM     SVE: 1/0/-3

## 2022-05-09 NOTE — OB PROVIDER TRIAGE NOTE - NSOBPROVIDERNOTE_OBGYN_ALL_OB_FT
27y/o P1 presenting with q10 minute contractions x12h, for r/o labor. Cervix 1cm, long, posterior. Infrequent ctx on EFM. Not in active labor at this time.      - Pt not currently in active labor    - Awaiting reactive NST, BPP     dw Dr. Meet Hackett, PGY-3 25y/o P1 presenting with q10 minute contractions x12h, for r/o labor. Cervix 1cm, long, posterior. Infrequent ctx on EFM. Not in active labor at this time.      - Pt not currently in active labor    - Awaiting reactive NST, BPP     dw Dr. Meet Hackett, PGY-3    Addendum patient is now s/p reactive NST and BPP 8/8 (Vtx, anterior placenta) with GELACIO 9.5. Stable for discharge home. Return precautions reviewed.    - Pt to follow-up with primary obygn at scheduled visit Wednesday 5/11    - Return precautions reviewed     larisa Hackett, PGY-3

## 2022-05-09 NOTE — ED ADULT TRIAGE NOTE - CHIEF COMPLAINT QUOTE
Patient complaining of sharp abdominal pain radiating to back since yesterday with nausea. States 40 wks pregnant

## 2022-05-09 NOTE — OB PROVIDER TRIAGE NOTE - HISTORY OF PRESENT ILLNESS
26y  at 40w1d presenting with ctx q10 minutes since yesterday evening , 5/10 in intensity. Patient reports +FM. Denies VB, LOF. No other complaints at this time.     PNC: [facility]     GBS: Negative   EFW: 7lbs   ObHx:   6#, uncomplicated   GynHx: Denies h/o cysts, fibroids   PMHx: H/o anxiety and depression, no meds   PSHx: Denies  Meds: PNV   Allergies: NKDA

## 2022-05-09 NOTE — OB RN TRIAGE NOTE - FALL HARM RISK - UNIVERSAL INTERVENTIONS
Bed in lowest position, wheels locked, appropriate side rails in place/Call bell, personal items and telephone in reach/Instruct patient to call for assistance before getting out of bed or chair/Non-slip footwear when patient is out of bed/[place] to call system/Physically safe environment - no spills, clutter or unnecessary equipment/Purposeful Proactive Rounding/Room/bathroom lighting operational, light cord in reach

## 2022-05-10 ENCOUNTER — INPATIENT (INPATIENT)
Facility: HOSPITAL | Age: 27
LOS: 1 days | Discharge: HOME | End: 2022-05-12
Attending: OBSTETRICS & GYNECOLOGY | Admitting: OBSTETRICS & GYNECOLOGY
Payer: COMMERCIAL

## 2022-05-10 VITALS
HEIGHT: 62 IN | HEART RATE: 78 BPM | SYSTOLIC BLOOD PRESSURE: 113 MMHG | RESPIRATION RATE: 16 BRPM | WEIGHT: 125 LBS | DIASTOLIC BLOOD PRESSURE: 62 MMHG | TEMPERATURE: 98 F

## 2022-05-10 DIAGNOSIS — O26.893 OTHER SPECIFIED PREGNANCY RELATED CONDITIONS, THIRD TRIMESTER: ICD-10-CM

## 2022-05-10 LAB
ABO RH CONFIRMATION: SIGNIFICANT CHANGE UP
ALBUMIN SERPL ELPH-MCNC: 3.5 G/DL — SIGNIFICANT CHANGE UP (ref 3.3–5.2)
ALP SERPL-CCNC: 123 U/L — HIGH (ref 40–120)
ALT FLD-CCNC: 10 U/L — SIGNIFICANT CHANGE UP
ANION GAP SERPL CALC-SCNC: 15 MMOL/L — SIGNIFICANT CHANGE UP (ref 5–17)
AST SERPL-CCNC: 14 U/L — SIGNIFICANT CHANGE UP
BASOPHILS # BLD AUTO: 0.03 K/UL — SIGNIFICANT CHANGE UP (ref 0–0.2)
BASOPHILS NFR BLD AUTO: 0.2 % — SIGNIFICANT CHANGE UP (ref 0–2)
BILIRUB SERPL-MCNC: 1 MG/DL — SIGNIFICANT CHANGE UP (ref 0.4–2)
BLD GP AB SCN SERPL QL: SIGNIFICANT CHANGE UP
BUN SERPL-MCNC: 7.5 MG/DL — LOW (ref 8–20)
CALCIUM SERPL-MCNC: 8.5 MG/DL — LOW (ref 8.6–10.2)
CHLORIDE SERPL-SCNC: 105 MMOL/L — SIGNIFICANT CHANGE UP (ref 98–107)
CO2 SERPL-SCNC: 17 MMOL/L — LOW (ref 22–29)
COVID-19 SPIKE DOMAIN AB INTERP: POSITIVE
COVID-19 SPIKE DOMAIN ANTIBODY RESULT: >250 U/ML — HIGH
CREAT SERPL-MCNC: 0.3 MG/DL — LOW (ref 0.5–1.3)
EGFR: 149 ML/MIN/1.73M2 — SIGNIFICANT CHANGE UP
EOSINOPHIL # BLD AUTO: 0.12 K/UL — SIGNIFICANT CHANGE UP (ref 0–0.5)
EOSINOPHIL NFR BLD AUTO: 0.9 % — SIGNIFICANT CHANGE UP (ref 0–6)
GLUCOSE SERPL-MCNC: 63 MG/DL — LOW (ref 70–99)
HBV SURFACE AG SERPL QL IA: SIGNIFICANT CHANGE UP
HCT VFR BLD CALC: 39.1 % — SIGNIFICANT CHANGE UP (ref 34.5–45)
HGB BLD-MCNC: 14.1 G/DL — SIGNIFICANT CHANGE UP (ref 11.5–15.5)
HIV 1 & 2 AB SERPL IA.RAPID: SIGNIFICANT CHANGE UP
HIV 1+2 AB+HIV1 P24 AG SERPL QL IA: SIGNIFICANT CHANGE UP
IMM GRANULOCYTES NFR BLD AUTO: 0.5 % — SIGNIFICANT CHANGE UP (ref 0–1.5)
LYMPHOCYTES # BLD AUTO: 2.83 K/UL — SIGNIFICANT CHANGE UP (ref 1–3.3)
LYMPHOCYTES # BLD AUTO: 20.1 % — SIGNIFICANT CHANGE UP (ref 13–44)
MCHC RBC-ENTMCNC: 32.6 PG — SIGNIFICANT CHANGE UP (ref 27–34)
MCHC RBC-ENTMCNC: 36.1 GM/DL — HIGH (ref 32–36)
MCV RBC AUTO: 90.3 FL — SIGNIFICANT CHANGE UP (ref 80–100)
MEV IGG SER-ACNC: 242 AU/ML — SIGNIFICANT CHANGE UP
MEV IGG+IGM SER-IMP: POSITIVE — SIGNIFICANT CHANGE UP
MONOCYTES # BLD AUTO: 1.01 K/UL — HIGH (ref 0–0.9)
MONOCYTES NFR BLD AUTO: 7.2 % — SIGNIFICANT CHANGE UP (ref 2–14)
NEUTROPHILS # BLD AUTO: 10.05 K/UL — HIGH (ref 1.8–7.4)
NEUTROPHILS NFR BLD AUTO: 71.1 % — SIGNIFICANT CHANGE UP (ref 43–77)
PLATELET # BLD AUTO: 347 K/UL — SIGNIFICANT CHANGE UP (ref 150–400)
POTASSIUM SERPL-MCNC: 4.3 MMOL/L — SIGNIFICANT CHANGE UP (ref 3.5–5.3)
POTASSIUM SERPL-SCNC: 4.3 MMOL/L — SIGNIFICANT CHANGE UP (ref 3.5–5.3)
PROT SERPL-MCNC: 6.4 G/DL — LOW (ref 6.6–8.7)
RBC # BLD: 4.33 M/UL — SIGNIFICANT CHANGE UP (ref 3.8–5.2)
RBC # FLD: 13.8 % — SIGNIFICANT CHANGE UP (ref 10.3–14.5)
RUBV IGG SER-ACNC: 18.3 INDEX — SIGNIFICANT CHANGE UP
RUBV IGG SER-IMP: POSITIVE — SIGNIFICANT CHANGE UP
SARS-COV-2 IGG+IGM SERPL QL IA: >250 U/ML — HIGH
SARS-COV-2 IGG+IGM SERPL QL IA: POSITIVE
SARS-COV-2 RNA SPEC QL NAA+PROBE: SIGNIFICANT CHANGE UP
SODIUM SERPL-SCNC: 137 MMOL/L — SIGNIFICANT CHANGE UP (ref 135–145)
T PALLIDUM AB TITR SER: NEGATIVE — SIGNIFICANT CHANGE UP
WBC # BLD: 14.11 K/UL — HIGH (ref 3.8–10.5)
WBC # FLD AUTO: 14.11 K/UL — HIGH (ref 3.8–10.5)

## 2022-05-10 PROCEDURE — 59409 OBSTETRICAL CARE: CPT | Mod: U9,GC

## 2022-05-10 RX ORDER — OXYTOCIN 10 UNIT/ML
333.33 VIAL (ML) INJECTION
Qty: 20 | Refills: 0 | Status: DISCONTINUED | OUTPATIENT
Start: 2022-05-10 | End: 2022-05-12

## 2022-05-10 RX ORDER — BENZOCAINE 10 %
1 GEL (GRAM) MUCOUS MEMBRANE EVERY 6 HOURS
Refills: 0 | Status: DISCONTINUED | OUTPATIENT
Start: 2022-05-10 | End: 2022-05-12

## 2022-05-10 RX ORDER — OXYCODONE HYDROCHLORIDE 5 MG/1
5 TABLET ORAL ONCE
Refills: 0 | Status: DISCONTINUED | OUTPATIENT
Start: 2022-05-10 | End: 2022-05-12

## 2022-05-10 RX ORDER — ACETAMINOPHEN 500 MG
975 TABLET ORAL
Refills: 0 | Status: DISCONTINUED | OUTPATIENT
Start: 2022-05-10 | End: 2022-05-12

## 2022-05-10 RX ORDER — SODIUM CHLORIDE 9 MG/ML
3 INJECTION INTRAMUSCULAR; INTRAVENOUS; SUBCUTANEOUS EVERY 8 HOURS
Refills: 0 | Status: DISCONTINUED | OUTPATIENT
Start: 2022-05-10 | End: 2022-05-12

## 2022-05-10 RX ORDER — DIBUCAINE 1 %
1 OINTMENT (GRAM) RECTAL EVERY 6 HOURS
Refills: 0 | Status: DISCONTINUED | OUTPATIENT
Start: 2022-05-10 | End: 2022-05-12

## 2022-05-10 RX ORDER — IBUPROFEN 200 MG
600 TABLET ORAL EVERY 6 HOURS
Refills: 0 | Status: DISCONTINUED | OUTPATIENT
Start: 2022-05-10 | End: 2022-05-12

## 2022-05-10 RX ORDER — HYDROCORTISONE 1 %
1 OINTMENT (GRAM) TOPICAL EVERY 6 HOURS
Refills: 0 | Status: DISCONTINUED | OUTPATIENT
Start: 2022-05-10 | End: 2022-05-12

## 2022-05-10 RX ORDER — CITRIC ACID/SODIUM CITRATE 300-500 MG
30 SOLUTION, ORAL ORAL ONCE
Refills: 0 | Status: DISCONTINUED | OUTPATIENT
Start: 2022-05-10 | End: 2022-05-10

## 2022-05-10 RX ORDER — OXYCODONE HYDROCHLORIDE 5 MG/1
5 TABLET ORAL
Refills: 0 | Status: DISCONTINUED | OUTPATIENT
Start: 2022-05-10 | End: 2022-05-12

## 2022-05-10 RX ORDER — TETANUS TOXOID, REDUCED DIPHTHERIA TOXOID AND ACELLULAR PERTUSSIS VACCINE, ADSORBED 5; 2.5; 8; 8; 2.5 [IU]/.5ML; [IU]/.5ML; UG/.5ML; UG/.5ML; UG/.5ML
0.5 SUSPENSION INTRAMUSCULAR ONCE
Refills: 0 | Status: DISCONTINUED | OUTPATIENT
Start: 2022-05-10 | End: 2022-05-12

## 2022-05-10 RX ORDER — SIMETHICONE 80 MG/1
80 TABLET, CHEWABLE ORAL EVERY 4 HOURS
Refills: 0 | Status: DISCONTINUED | OUTPATIENT
Start: 2022-05-10 | End: 2022-05-12

## 2022-05-10 RX ORDER — IBUPROFEN 200 MG
600 TABLET ORAL EVERY 6 HOURS
Refills: 0 | Status: COMPLETED | OUTPATIENT
Start: 2022-05-10 | End: 2023-04-08

## 2022-05-10 RX ORDER — KETOROLAC TROMETHAMINE 30 MG/ML
30 SYRINGE (ML) INJECTION ONCE
Refills: 0 | Status: DISCONTINUED | OUTPATIENT
Start: 2022-05-10 | End: 2022-05-10

## 2022-05-10 RX ORDER — SODIUM CHLORIDE 9 MG/ML
500 INJECTION, SOLUTION INTRAVENOUS ONCE
Refills: 0 | Status: COMPLETED | OUTPATIENT
Start: 2022-05-10 | End: 2022-05-10

## 2022-05-10 RX ORDER — PRAMOXINE HYDROCHLORIDE 150 MG/15G
1 AEROSOL, FOAM RECTAL EVERY 4 HOURS
Refills: 0 | Status: DISCONTINUED | OUTPATIENT
Start: 2022-05-10 | End: 2022-05-12

## 2022-05-10 RX ORDER — DIPHENHYDRAMINE HCL 50 MG
25 CAPSULE ORAL EVERY 6 HOURS
Refills: 0 | Status: DISCONTINUED | OUTPATIENT
Start: 2022-05-10 | End: 2022-05-12

## 2022-05-10 RX ORDER — LANOLIN
1 OINTMENT (GRAM) TOPICAL EVERY 6 HOURS
Refills: 0 | Status: DISCONTINUED | OUTPATIENT
Start: 2022-05-10 | End: 2022-05-12

## 2022-05-10 RX ORDER — AER TRAVELER 0.5 G/1
1 SOLUTION RECTAL; TOPICAL EVERY 4 HOURS
Refills: 0 | Status: DISCONTINUED | OUTPATIENT
Start: 2022-05-10 | End: 2022-05-12

## 2022-05-10 RX ORDER — SODIUM CHLORIDE 9 MG/ML
1000 INJECTION, SOLUTION INTRAVENOUS
Refills: 0 | Status: DISCONTINUED | OUTPATIENT
Start: 2022-05-10 | End: 2022-05-10

## 2022-05-10 RX ORDER — OXYTOCIN 10 UNIT/ML
333.33 VIAL (ML) INJECTION
Qty: 20 | Refills: 0 | Status: COMPLETED | OUTPATIENT
Start: 2022-05-10 | End: 2022-05-10

## 2022-05-10 RX ORDER — MAGNESIUM HYDROXIDE 400 MG/1
30 TABLET, CHEWABLE ORAL
Refills: 0 | Status: DISCONTINUED | OUTPATIENT
Start: 2022-05-10 | End: 2022-05-12

## 2022-05-10 RX ADMIN — SODIUM CHLORIDE 500 MILLILITER(S): 9 INJECTION, SOLUTION INTRAVENOUS at 14:44

## 2022-05-10 RX ADMIN — SODIUM CHLORIDE 125 MILLILITER(S): 9 INJECTION, SOLUTION INTRAVENOUS at 14:39

## 2022-05-10 RX ADMIN — Medication 30 MILLIGRAM(S): at 16:45

## 2022-05-10 RX ADMIN — Medication 30 MILLIGRAM(S): at 16:35

## 2022-05-10 RX ADMIN — SODIUM CHLORIDE 3 MILLILITER(S): 9 INJECTION INTRAMUSCULAR; INTRAVENOUS; SUBCUTANEOUS at 22:29

## 2022-05-10 RX ADMIN — Medication 975 MILLIGRAM(S): at 21:00

## 2022-05-10 RX ADMIN — Medication 600 MILLIGRAM(S): at 23:48

## 2022-05-10 RX ADMIN — SODIUM CHLORIDE 125 MILLILITER(S): 9 INJECTION, SOLUTION INTRAVENOUS at 03:44

## 2022-05-10 RX ADMIN — Medication 1000 MILLIUNIT(S)/MIN: at 15:51

## 2022-05-10 NOTE — OB PROVIDER DELIVERY SUMMARY - NSSELHIDDEN_OBGYN_ALL_OB_FT
[NS_[email or web address]_ALL_OB_FT:[id number]],[NS_DeliveryAssist1_OBGYN_ALL_OB_FT:[id number]],[NS_DeliveryAssist2_OBGYN_ALL_OB_FT:[id number]]

## 2022-05-10 NOTE — OB PROVIDER H&P - NSHPPHYSICALEXAM_GEN_ALL_CORE
T(C): --  HR: 69 (05-10-22 @ 03:28) (69 - 69)  BP: 107/62 (05-10-22 @ 03:28) (107/62 - 107/62)  RR: --  SpO2: --  Gen: NAD, well-appearing  Heart: S1 S2, RRR  Lungs: CTAB  Abd: soft, gravid  Ext: non-edematous, non-tender   SVE:   SSE: cervix visualized, closed and without any signs of bleeding or drainage, no pooling     FHT:   [place]: T(C): --  HR: 69 (05-10-22 @ 03:28) (69 - 69)  BP: 107/62 (05-10-22 @ 03:28) (107/62 - 107/62)  RR: --  SpO2: --  Gen: NAD, well-appearing  Heart: S1 S2, RRR  Lungs: CTAB  Abd: soft, gravid  Ext: non-edematous, non-tender   SVE: 4/80/-2    FHT: baseline 130bpm, moderate variability, +accels, no decels  E[place]: intermittent contractions (~1 every 10 min)  Bedside sono: cephalic presentation, anterior placenta, AF pockets noted T(C): --  HR: 69 (05-10-22 @ 03:28) (69 - 69)  BP: 107/62 (05-10-22 @ 03:28) (107/62 - 107/62)  RR: --  SpO2: --    Gen: NAD, well-appearing  Heart: S1 S2, RRR  Lungs: CTAB  Abd: soft, gravid  Ext: non-edematous, non-tender   SVE: 4/80/-2    FHT: baseline 130bpm, moderate variability, +accels, no decels  [place]: q5-7  Bedside sono: cephalic presentation, anterior placenta

## 2022-05-10 NOTE — OB PROVIDER LABOR PROGRESS NOTE - NS_OBIHIFHRDETAILS_OBGYN_ALL_OB_FT
130 moderate variability, intermittent variable decels   overall reassuring
120, moderate variability + accelerations no decelerations
145bpm, cat II with recurrent variables

## 2022-05-10 NOTE — OB RN DELIVERY SUMMARY - NSSELHIDDEN_OBGYN_ALL_OB_FT
[NS_DeliveryAttending1_OBGYN_ALL_OB_FT:[id number]],[NS_[email or web address]_OB_[id number]_DeliveryAssist2_OBGYN_ALL_OB_FT:[id number]],[NS_DeliveryRN_OBGYN_ALL_OB_FT:[id number]]

## 2022-05-10 NOTE — OB PROVIDER H&P - ATTENDING COMMENTS
26y  at 40w2d GA by LMP who presents to L&D for contractions  VSS  SVE /-2  27 y/o  @ 40+2 here in labor   - plan to admit   - desires epidrual   - expectant management    Maria Esther Hensley MD

## 2022-05-10 NOTE — OB PROVIDER DELIVERY SUMMARY - NSPROVIDERDELIVERYNOTE_OBGYN_ALL_OB_FT
26y  presenting for SROM, labor.  Patient felt rectal pressure and was found to be fully dilated, 0 station. She pushed effectively for 30 minutes, and delivered a viable female infant at 1548. Vertex delivered without difficulty, no nuchal cord noted,  shoulders then delivered without difficulty. Cord clamped and cut. Placenta delivered spontaneously and intact at 1551. Pitocin started. Excellent hemostasis was achieved. Uterus, cervix, perineum and vagina were inspected and 1st degree was noted and repaired with 3-0 vicryl. Apgars 9/9, EBL 22cc. 26y  presenting for SROM, labor.  Patient felt rectal pressure and was found to be fully dilated, 0 station. She pushed to deliver a viable female infant at 1548. Vertex delivered without difficulty, shoulders then delivered without difficulty. Cord clamped and cut. Placenta delivered spontaneously and intact at 1551. Pitocin started. Excellent hemostasis was achieved. Uterus, cervix, perineum and vagina were inspected and 1st degree was noted and repaired with 3-0 vicryl. Apgars 9/9, EBL 22cc.    OB attending:  resident note reviewed, uncomplicated vaginal delivery, live female infant apgars 9/9 - cord wrapped around forearm and foot  Kecia Salinas MD

## 2022-05-10 NOTE — OB PROVIDER H&P - HISTORY OF PRESENT ILLNESS
ALICE VICTORIA is a 26y  at 40w2d GA by LMP who presents to L&D for pelvic pain. Patient reports onset of midline pelvic pain yesterday that worsens with standing and walking. She was evaluated at [facility] on 22 for similar symptoms, found to have reactive NST and BPP , and was discharged home. She endorses intermittent irregular contractions over the past day as well. She also reports urinary frequency and lower back pain, both of which began in recent weeks. She denies any headache, vision changes, nausea/vomiting, vaginal bleeding, and leakage of fluid. She endorses good fetal movement.     Pregnancy course is uncomplicated. Follows with Dr. Blood for prenatal care.     POB:   - G1:  2020  PGYN: -fibroids/-cysts, denied STD hx, denies abnormal PAPs  PMH: none  PSH: none  SH: Denies tobacco use, EtOH use and illicit drug use during the pregnancy; Feels safe at home  Meds: none  All: NKDA    A/P:     Fetus:  [place]:  Dispo: Continue to observe.     Discussed with    ALICE VICTORIA is a 26y  at 40w2d GA by LMP who presents to L&D for pelvic pain. Patient reports onset of midline pelvic pain yesterday that worsens with standing and walking. She was evaluated at [facility] on 22 for similar symptoms, found to be 1/70/-3 on SVE and have a reactive NST and BPP , and was discharged home. She endorses intermittent irregular contractions over the past day as well. She also reports urinary frequency and lower back pain, both of which began in recent weeks. She denies any headache, vision changes, nausea/vomiting, vaginal bleeding, and leakage of fluid. She endorses good fetal movement. She was initially scheduled for an induction at 1000 today (5/10) due to GA but came in earlier due to this pain.    Pregnancy course is uncomplicated. Follows with Dr. Blood for prenatal care. GBS negative.    POB:   - G1:  2020  PGYN: -fibroids/-cysts, denied STD hx, denies abnormal PAPs  PMH: none  PSH: none  SH: Denies tobacco use, EtOH use and illicit drug use during the pregnancy; Feels safe at home  Meds: none  All: NKDA ALICE VICTORIA is a 26y  at 40w2d GA by LMP who presents to L&D for pelvic pain. Patient reports onset of midline pelvic pain yesterday that worsens with standing and walking. She was evaluated at [facility] on 22 for similar symptoms, found to be 1/70/-3 on SVE and have a reactive NST and BPP , and was discharged home. She endorses intermittent irregular contractions over the past day as well. She also reports urinary frequency and lower back pain, both of which began in recent weeks. She denies any headache, vision changes, nausea/vomiting, vaginal bleeding, and leakage of fluid. She endorses good fetal movement. She was initially scheduled for an induction at 1000 today (5/10) due to GA but came in earlier due to this pain.    Pregnancy course is uncomplicated.   - Follows with Dr. Blood ([place]) for prenatal care  - GBS negative    POB:   - G1:  2020  PGYN: -fibroids/-cysts, denied STD hx, denies abnormal PAPs  PMH: anemia, depression (prior to first pregnancy in 2019, used to take medication but d/c'd years ago)  PSH: none  SH: Denies tobacco use, EtOH use and illicit drug use during the pregnancy; Feels safe at home  Meds: PNV, iron  All: NKDA ALICE VICTORIA is a 26y  at 40w2d GA by LMP who presents to L&D for pelvic pain. Patient reports onset of midline pelvic pain yesterday that worsens with standing and walking. She was evaluated at [facility] on 22 for similar symptoms, found to be 1/70/-3 on SVE and have a reactive NST and BPP , and was discharged home. She endorses intermittent irregular contractions over the past day as well. She also reports urinary frequency and lower back pain, both of which began in recent weeks. She denies any headache, vision changes, nausea/vomiting, vaginal bleeding, and leakage of fluid. She endorses good fetal movement. She was initially scheduled for an induction at 1000 today (5/10) due to full term GA but came in earlier due to this pain.    Pregnancy course is uncomplicated.   - Follows with Dr. Penn ([facility]) for prenatal care  - GBS negative    POB:   - G1:  2020, 6lb7oz   PGYN: -fibroids/-cysts, denied STD hx, denies abnormal PAPs  PMH: anemia, depression (prior to first pregnancy in 2019, used to take medication but d/c'd years ago)  PSH: none  SH: Denies tobacco use, EtOH use and illicit drug use during the pregnancy; Feels safe at home  Meds: PNV, iron  All: NKDA

## 2022-05-10 NOTE — OB RN DELIVERY SUMMARY - NS_SEPSISRSKCALC_OBGYN_ALL_OB_FT
EOS calculated successfully. EOS Risk Factor: 0.5/1000 live births ([facility] national incidence); GA=40w2d; Temp=98.24; ROM=6.2; GBS='Negative'; Antibiotics='No antibiotics or any antibiotics < 2 hrs prior to birth'

## 2022-05-10 NOTE — OB PROVIDER LABOR PROGRESS NOTE - ASSESSMENT
continue expectant management due to good progress, will augment with pitocin if contractions space out SROM at the time of the exam with light meconium, Neonatology to be at bedside during delivery    continue expectant management due to good progress, will augment with pitocin if contractions space out

## 2022-05-10 NOTE — OB PROVIDER H&P - ASSESSMENT
A/P: 26y  at 40w2d GA by LMP who presents to L&D for pelvic pain, likely 2/2 fetal position and pressure. SVE 4/-2. Patient initially scheduled for IOL at 1000 today - will admit patient for IOL.   - VSS  - Prenatal labs sent.  -     Discussed with Dr. Hensley. A/P: 26y  at 40w2d GA by LMP who presents to L&D for pelvic pain, likely 2/2 fetal position and pressure. SVE 4/80/-2. Patient initially scheduled for IOL at 1000 today - will admit patient for IOL.   - VSS  - Prenatal labs sent  - Continuous uterine and fetal monitoring  - Pain medication upon patient request  - Pt. requesting epidural  - GBS negative, no need for abx at this time    Discussed with Dr. Hensley. A/P: 26y  at 40w2d GA by LMP who presents to L&D for pelvic pain, likely 2/2 fetal position and pressure. SVE 4/80/-2. Patient initially scheduled for IOL at 1000 today, will admit patient for IOL.   - Prenatal and admission labs sent  - IV fluids started  - Continuous uterine and fetal monitoring  - Augment with Pitocin as needed  - Pain medication upon patient request  - Pt. requesting epidural  - GBS negative, no need for abx at this time    Discussed with Dr. Hensley. A/P: 26y  at 40w2d GA by LMP who presents to L&D in early labor.    -Admit to L&D, Consent   - Prenatal and admission labs sent  - IV fluids started  - Continuous uterine and fetal monitoring  - Augment with Pitocin as needed  - Analgesia: Pt. requesting epidural  - GBS negative, no need for abx at this time  - anticipate vaginal delivery     Discussed with Dr. Hensley (service attending on call)

## 2022-05-10 NOTE — OB PROVIDER LABOR PROGRESS NOTE - NS_SUBJECTIVE/OBJECTIVE_OBGYN_ALL_OB_FT
Patient is comfortable with epidural anesthesia
Pt seen and examined at bedside due to recurrent variables.
patient seen and examined to assess progress of labor  comfortable with epidural

## 2022-05-11 LAB
HCT VFR BLD CALC: 34.2 % — LOW (ref 34.5–45)
HGB BLD-MCNC: 12.2 G/DL — SIGNIFICANT CHANGE UP (ref 11.5–15.5)

## 2022-05-11 RX ORDER — ACETAMINOPHEN 500 MG
3 TABLET ORAL
Qty: 60 | Refills: 0
Start: 2022-05-11 | End: 2022-05-15

## 2022-05-11 RX ORDER — IBUPROFEN 200 MG
1 TABLET ORAL
Qty: 20 | Refills: 0
Start: 2022-05-11 | End: 2022-05-15

## 2022-05-11 RX ADMIN — Medication 1 TABLET(S): at 12:16

## 2022-05-11 RX ADMIN — Medication 600 MILLIGRAM(S): at 17:24

## 2022-05-11 RX ADMIN — Medication 975 MILLIGRAM(S): at 14:35

## 2022-05-11 RX ADMIN — Medication 600 MILLIGRAM(S): at 12:16

## 2022-05-11 RX ADMIN — Medication 975 MILLIGRAM(S): at 09:11

## 2022-05-11 RX ADMIN — Medication 975 MILLIGRAM(S): at 21:02

## 2022-05-11 RX ADMIN — Medication 600 MILLIGRAM(S): at 05:55

## 2022-05-11 NOTE — DISCHARGE NOTE OB - PLAN OF CARE
Please call your provider to schedule postpartum visit in 2 weeks. Take medications as directed, regular diet, activity as tolerated. Exclusive breast feeding for the first 6 months is recommended. Nothing per vagina for 6 weeks (incl. sex, douching, etc). If you have additional concerns, please inform your provider.

## 2022-05-11 NOTE — DISCHARGE NOTE OB - PATIENT PORTAL LINK FT
You can access the FollowMyHealth Patient Portal offered by [facility] by registering at the following website: [email or web address]. By joining [facility]’s FollowMyHealth portal, you will also be able to view your health information using other applications (apps) compatible with our system.

## 2022-05-11 NOTE — DISCHARGE NOTE OB - MEDICATION SUMMARY - MEDICATIONS TO TAKE
I will START or STAY ON the medications listed below when I get home from the hospital:    acetaminophen 325 mg oral tablet  -- 3 tab(s) by mouth every 6 hours   -- Indication: For Other specified pregnancy related conditions in third trimester    ibuprofen 600 mg oral tablet  -- 1 tab(s) by mouth every 6 hours  -- Indication: For Other specified pregnancy related conditions in third trimester

## 2022-05-11 NOTE — DISCHARGE NOTE OB - CARE PLAN
Principal Discharge DX:	 (normal spontaneous vaginal delivery)  Assessment and plan of treatment:	Please call your provider to schedule postpartum visit in 2 weeks. Take medications as directed, regular diet, activity as tolerated. Exclusive breast feeding for the first 6 months is recommended. Nothing per vagina for 6 weeks (incl. sex, douching, etc). If you have additional concerns, please inform your provider.   1

## 2022-05-11 NOTE — DISCHARGE NOTE OB - NS MD DC FALL RISK RISK
For information on Fall & Injury Prevention, visit: [email or web address] OR  [email or web address] OR  https://www.cdc.gov/steadi/patient.html

## 2022-05-12 VITALS
DIASTOLIC BLOOD PRESSURE: 61 MMHG | OXYGEN SATURATION: 98 % | TEMPERATURE: 98 F | RESPIRATION RATE: 17 BRPM | HEART RATE: 62 BPM | SYSTOLIC BLOOD PRESSURE: 96 MMHG

## 2022-05-12 RX ADMIN — Medication 600 MILLIGRAM(S): at 11:40

## 2022-05-12 RX ADMIN — Medication 1 TABLET(S): at 11:40

## 2022-05-12 RX ADMIN — Medication 975 MILLIGRAM(S): at 08:52

## 2022-05-12 RX ADMIN — Medication 600 MILLIGRAM(S): at 05:49

## 2022-05-12 NOTE — PROGRESS NOTE ADULT - ASSESSMENT
A/P:  Patient is a 27yo  now PPD#1 s/p spontaneous vaginal delivery at 40 2/7 weeks gestation    -Vital signs stable  -Postpartum H&H pending  -Voiding, tolerating PO, bowel function nml   -Advance care as tolerated   -Continue routine postpartum care and education.  -Healthy female infant  -Stable for discharge home pending AM H&H, OB attending approval and pediatrics clearance
A/P:  Patient is a 25yo  now PPD#2 s/p spontaneous vaginal delivery at 40 2/7 weeks gestation    -Vital signs stable  -hgb 14.4>12.2  -Voiding, tolerating PO, bowel function nml   -Advance care as tolerated   -Continue routine postpartum care and education.  -Healthy female infant  -Stable for discharge home pending OB attending approval and pediatrics clearance

## 2022-05-12 NOTE — PROGRESS NOTE ADULT - SUBJECTIVE AND OBJECTIVE BOX
ALICE VICTORIA is a 27yo  now PPD#2 s/p spontaneous vaginal delivery at 40 2/7 weeks gestation    S:    The patient has no complaints.  Pain controlled with current medications.   She is ambulating without difficulty and tolerating PO   + flatus/-BM/+ voiding   She is breast and bottle feeding    O:    Vital Signs Last 24 Hrs  T(C): 36.9 (11 May 2022 16:28), Max: 36.9 (11 May 2022 16:28)  T(F): 98.4 (11 May 2022 16:28), Max: 98.4 (11 May 2022 16:28)  HR: 91 (11 May 2022 16:28) (91 - 91)  BP: 102/62 (11 May 2022 16:28) (102/62 - 102/62)  BP(mean): --  RR: 16 (11 May 2022 16:28) (16 - 16)  SpO2: 98% (11 May 2022 16:28) (98% - 98%)    Gen: NAD, AOx3  CV: RRR  Pulm: CTAB  Breast: nontender, non-engorged   Abdomen:  soft, non-tender, non-distended, +bowel sounds.  Uterus:  Fundus firm below umbilicus  VE:  +lochia  Ext:  mild symmetric b/l LE edema, no erythema or tenderness                          14.1   14.11 )-----------( 347      ( 10 May 2022 04:07 )             39.1     05-10    137  |  105  |  7.5<L>  ----------------------------<  63<L>  4.3   |  17.0<L>  |  0.30<L>    Ca    8.5<L>      10 May 2022 04:07    TPro  6.4<L>  /  Alb  3.5  /  TBili  1.0  /  DBili  x   /  AST  14  /  ALT  10  /  AlkPhos  123<H>  05-10      
ALICE VICTORIA is a 27yo  now PPD#1 s/p spontaneous vaginal delivery at 40 2/7 weeks gestation    S:    The patient has no complaints.  Pain controlled with current medications.   She is ambulating without difficulty and tolerating PO   + flatus/-BM/+ voiding   She is breast and bottle feeding    O:    T(C): 36.6 (22 @ 03:19), Max: 37.2 (05-10-22 @ 16:00)  HR: 79 (22 @ 03:19) (64 - 117)  BP: 95/58 (22 @ 03:19) (88/48 - 120/58)  RR: 18 (22 @ 03:19) (16 - 18)  SpO2: 99% (22 @ 03:19) (93% - 99%)    Gen: NAD, AOx3  CV: RRR  Pulm: CTAB  Breast: nontender, non-engorged   Abdomen:  soft, non-tender, non-distended, +bowel sounds.  Uterus:  Fundus firm below umbilicus  VE:  +lochia  Ext:  mild symmetric b/l LE edema, no erythema or tenderness                          14.1   14.11 )-----------( 347      ( 10 May 2022 04:07 )             39.1     05-10    137  |  105  |  7.5<L>  ----------------------------<  63<L>  4.3   |  17.0<L>  |  0.30<L>    Ca    8.5<L>      10 May 2022 04:07    TPro  6.4<L>  /  Alb  3.5  /  TBili  1.0  /  DBili  x   /  AST  14  /  ALT  10  /  AlkPhos  123<H>  05-10

## 2022-05-30 PROCEDURE — 85018 HEMOGLOBIN: CPT

## 2022-05-30 PROCEDURE — U0005: CPT

## 2022-05-30 PROCEDURE — 86762 RUBELLA ANTIBODY: CPT

## 2022-05-30 PROCEDURE — 87340 HEPATITIS B SURFACE AG IA: CPT

## 2022-05-30 PROCEDURE — 86850 RBC ANTIBODY SCREEN: CPT

## 2022-05-30 PROCEDURE — 85025 COMPLETE CBC W/AUTO DIFF WBC: CPT

## 2022-05-30 PROCEDURE — 86769 SARS-COV-2 COVID-19 ANTIBODY: CPT

## 2022-05-30 PROCEDURE — 80053 COMPREHEN METABOLIC PANEL: CPT

## 2022-05-30 PROCEDURE — 87389 HIV-1 AG W/HIV-1&-2 AB AG IA: CPT

## 2022-05-30 PROCEDURE — 86900 BLOOD TYPING SEROLOGIC ABO: CPT

## 2022-05-30 PROCEDURE — 36415 COLL VENOUS BLD VENIPUNCTURE: CPT

## 2022-05-30 PROCEDURE — U0003: CPT

## 2022-05-30 PROCEDURE — 86780 TREPONEMA PALLIDUM: CPT

## 2022-05-30 PROCEDURE — 86765 RUBEOLA ANTIBODY: CPT

## 2022-05-30 PROCEDURE — 86703 HIV-1/HIV-2 1 RESULT ANTBDY: CPT

## 2022-05-30 PROCEDURE — 86901 BLOOD TYPING SEROLOGIC RH(D): CPT

## 2022-05-30 PROCEDURE — 85014 HEMATOCRIT: CPT
